# Patient Record
Sex: FEMALE | Race: OTHER | NOT HISPANIC OR LATINO | URBAN - METROPOLITAN AREA
[De-identification: names, ages, dates, MRNs, and addresses within clinical notes are randomized per-mention and may not be internally consistent; named-entity substitution may affect disease eponyms.]

---

## 2018-01-20 ENCOUNTER — EMERGENCY (EMERGENCY)
Facility: HOSPITAL | Age: 38
LOS: 1 days | Discharge: HOME | End: 2018-01-20
Attending: EMERGENCY MEDICINE | Admitting: EMERGENCY MEDICINE
Payer: COMMERCIAL

## 2018-01-20 VITALS
HEIGHT: 64 IN | HEART RATE: 106 BPM | OXYGEN SATURATION: 99 % | SYSTOLIC BLOOD PRESSURE: 117 MMHG | TEMPERATURE: 98 F | RESPIRATION RATE: 18 BRPM | DIASTOLIC BLOOD PRESSURE: 81 MMHG | WEIGHT: 169.09 LBS

## 2018-01-20 VITALS
TEMPERATURE: 98 F | RESPIRATION RATE: 18 BRPM | OXYGEN SATURATION: 97 % | HEART RATE: 88 BPM | SYSTOLIC BLOOD PRESSURE: 107 MMHG | DIASTOLIC BLOOD PRESSURE: 71 MMHG

## 2018-01-20 PROCEDURE — 93010 ELECTROCARDIOGRAM REPORT: CPT

## 2018-01-20 PROCEDURE — 99284 EMERGENCY DEPT VISIT MOD MDM: CPT | Mod: 25

## 2018-01-20 PROCEDURE — 93971 EXTREMITY STUDY: CPT

## 2018-01-20 PROCEDURE — 93005 ELECTROCARDIOGRAM TRACING: CPT

## 2018-01-20 RX ORDER — ONDANSETRON 8 MG/1
4 TABLET, FILM COATED ORAL ONCE
Qty: 0 | Refills: 0 | Status: DISCONTINUED | OUTPATIENT
Start: 2018-01-20 | End: 2018-01-20

## 2018-01-20 RX ORDER — METFORMIN HYDROCHLORIDE 850 MG/1
1 TABLET ORAL
Qty: 0 | Refills: 0 | COMMUNITY

## 2018-01-20 RX ORDER — ACETAMINOPHEN 500 MG
1000 TABLET ORAL ONCE
Qty: 0 | Refills: 0 | Status: DISCONTINUED | OUTPATIENT
Start: 2018-01-20 | End: 2018-01-20

## 2018-01-20 RX ORDER — DROSPIRENONE AND ETHINYL ESTRADIOL 0.03MG-3MG
0 KIT ORAL
Qty: 0 | Refills: 0 | COMMUNITY

## 2018-01-20 RX ORDER — IBUPROFEN 200 MG
600 TABLET ORAL ONCE
Qty: 0 | Refills: 0 | Status: COMPLETED | OUTPATIENT
Start: 2018-01-20 | End: 2018-01-20

## 2018-01-20 RX ORDER — LEVOTHYROXINE SODIUM 125 MCG
1 TABLET ORAL
Qty: 0 | Refills: 0 | COMMUNITY

## 2018-01-20 RX ADMIN — Medication 600 MILLIGRAM(S): at 22:19

## 2018-01-20 NOTE — ED ADULT NURSE NOTE - PMH
Morbid obesity    Obstructive sleep apnea  prior to    gastric bypass- never retested  Thyroid nodule

## 2018-01-20 NOTE — ED PROVIDER NOTE - PLAN OF CARE
You were seen for calf pain. Your DVT study was negative.    1) Please follow-up with your primary care doctor within the next 3 days.  Please call today or tomorrow for an appointment.  If you cannot follow-up with your doctor(s), please return to the ED for any urgent issues.  2) If you have any worsening of symptoms or any other concerns please return to the ED immediately.  3) Please continue taking your home medications as directed.  4) You may have been given a copy of your labs and/or imaging.  Please go over these with your primary care doctor.

## 2018-01-20 NOTE — ED PROVIDER NOTE - PHYSICAL EXAMINATION
AAOx3, NAD  Head: NCAT  ENT: Airway patent, MMM  Cardiac: Normal rate, normal rhythm, no murmurs/rubs/gallops appreciated  Respiratory: Lungs CTA B/L  Gastrointestinal: +BS, Abdomen soft, nontender, nondistended, no rebound, no guarding, no organomegaly   MSK: No gross abnormalities, FROM of all four extremities, no edema  HEME: Extremities warm, pulses intact and symmetrical in all four extremities  Skin: No rashes, no lesions  Neuro: No gross neurologic deficits

## 2018-01-20 NOTE — ED PROVIDER NOTE - ATTENDING CONTRIBUTION TO CARE
attending Amrit: 37yF h/o thyroid ca s/p resection PCOS on OCPs, gastric bypass 2012, recent travel to [place] for abdominoplasty and liposuction p/w L calf pain. Denies chest pain or SOB. On exam, well-appearing, no pedal edema mild L calf tenderness, negative Teresita's sign, no rashes, lungs clear, S1S2 normal. Multiple risk factors for DVT. Will obtain lower extremity duplex r/o DVT, pain control and reassess.

## 2018-01-20 NOTE — ED ADULT NURSE NOTE - OBJECTIVE STATEMENT
36 yo F pmh of gastric bypass, PCOS, and thyroid CA, currently taking synthroid, birth control AISHA, and metformin,  came to ED s/p tummy tuck and lipo surgery on 1/3/18 c/o left calf pain starting 1/15/18 and worsening today.  Pt states that she was placed on anticoagulation shots after the surgery which were d/c 1/11/18.  Pt had recent surgical procedure in [place] and had recent travel back to [place] on 1/18/18.  Pt states that she has been ambulating hourly and been compliant with directions to avoid DVT.  Pt states that the pain gradually grew worse over the past week.  Pain is worse when she walks and has relief from pain when she elevates her leg.  Denies chest pain, back pain, SOB, fevers/chills, redness or drainage from surgical site, numbness, tingling, Lightheadedness, dizziness, changes in urinary or bowel habits.  A&Ox4, lungs clear bilaterally, +peripheral pulses bilaterally, skin w/d/i, no swelling or redness noted in left calf, not tender to palpation.  Safety and comfort maintained.  Will continue to monitor.  friend present at bedside.

## 2018-01-20 NOTE — ED PROVIDER NOTE - OBJECTIVE STATEMENT
37F hx thyroid ca s/p r/s, PCOS, gastric bypass 2012, c/o of left leg pain , returned from [place] on Jan 18th, after tummy tuck and no GI/ complaints, recent f/c/ was on AC injectons last taken on the 13th. sw on OCPs synthroid, and metformed No blood thinners currently, Aunt had hx of blood clots, unknown cause. No sob/cp/ palpitations/ f/c/ vision changes/ no pain any where else 37F hx thyroid ca s/p r/s, PCOS, gastric bypass 2012, c/o of left leg pain , returned from [place] on Jan 18th, after tummy tuck/ liposuction (1/3) with subsequent 10 day course of lovenox (last taken 1/13). and no GI/ complaints, recent f/c/. Currently on OCPs, synthroid, and metformin. No blood thinners currently,. Aunt had hx of blood clots, unknown cause. No sob/cp/ palpitations/ f/c/ vision changes/ no pain any where else    PCP:

## 2018-01-20 NOTE — ED ADULT NURSE NOTE - CHPI ED SYMPTOMS NEG
no nausea/no fever/no shortness of breath/no chest pain/no back pain/no chills/no dizziness/no cough/no syncope/no vomiting/no diaphoresis

## 2018-01-20 NOTE — ED PROVIDER NOTE - CARE PLAN
Principal Discharge DX:	Pain of left calf  Assessment and plan of treatment:	You were seen for calf pain. Your DVT study was negative.    1) Please follow-up with your primary care doctor within the next 3 days.  Please call today or tomorrow for an appointment.  If you cannot follow-up with your doctor(s), please return to the ED for any urgent issues.  2) If you have any worsening of symptoms or any other concerns please return to the ED immediately.  3) Please continue taking your home medications as directed.  4) You may have been given a copy of your labs and/or imaging.  Please go over these with your primary care doctor.

## 2018-01-20 NOTE — ED ADULT TRIAGE NOTE - CHIEF COMPLAINT QUOTE
pt states left geraldine f pain onset 5 days ago worsening last night recent flight from [place] 2 nights ago pt had liposuxtion and tummy tuck in [place] January 3rd

## 2018-01-21 PROCEDURE — 93971 EXTREMITY STUDY: CPT | Mod: 26

## 2018-02-01 ENCOUNTER — TRANSCRIPTION ENCOUNTER (OUTPATIENT)
Age: 38
End: 2018-02-01

## 2018-02-01 ENCOUNTER — EMERGENCY (EMERGENCY)
Facility: HOSPITAL | Age: 38
LOS: 1 days | Discharge: HOME | End: 2018-02-01
Attending: EMERGENCY MEDICINE | Admitting: EMERGENCY MEDICINE
Payer: COMMERCIAL

## 2018-02-01 VITALS
TEMPERATURE: 98 F | OXYGEN SATURATION: 99 % | SYSTOLIC BLOOD PRESSURE: 122 MMHG | WEIGHT: 166.01 LBS | HEART RATE: 93 BPM | DIASTOLIC BLOOD PRESSURE: 88 MMHG | RESPIRATION RATE: 16 BRPM

## 2018-02-01 LAB
ANION GAP SERPL CALC-SCNC: 12 MMOL/L — SIGNIFICANT CHANGE UP (ref 5–17)
BUN SERPL-MCNC: 16 MG/DL — SIGNIFICANT CHANGE UP (ref 7–23)
CALCIUM SERPL-MCNC: 9.4 MG/DL — SIGNIFICANT CHANGE UP (ref 8.4–10.5)
CHLORIDE SERPL-SCNC: 103 MMOL/L — SIGNIFICANT CHANGE UP (ref 96–108)
CO2 SERPL-SCNC: 24 MMOL/L — SIGNIFICANT CHANGE UP (ref 22–31)
CREAT SERPL-MCNC: 0.77 MG/DL — SIGNIFICANT CHANGE UP (ref 0.5–1.3)
GLUCOSE SERPL-MCNC: 77 MG/DL — SIGNIFICANT CHANGE UP (ref 70–99)
HCT VFR BLD CALC: 35.6 % — SIGNIFICANT CHANGE UP (ref 34.5–45)
HGB BLD-MCNC: 12.5 G/DL — SIGNIFICANT CHANGE UP (ref 11.5–15.5)
MCHC RBC-ENTMCNC: 32 PG — SIGNIFICANT CHANGE UP (ref 27–34)
MCHC RBC-ENTMCNC: 35.3 GM/DL — SIGNIFICANT CHANGE UP (ref 32–36)
MCV RBC AUTO: 90.7 FL — SIGNIFICANT CHANGE UP (ref 80–100)
PLATELET # BLD AUTO: 283 K/UL — SIGNIFICANT CHANGE UP (ref 150–400)
POTASSIUM SERPL-MCNC: 4.2 MMOL/L — SIGNIFICANT CHANGE UP (ref 3.5–5.3)
POTASSIUM SERPL-SCNC: 4.2 MMOL/L — SIGNIFICANT CHANGE UP (ref 3.5–5.3)
RBC # BLD: 3.92 M/UL — SIGNIFICANT CHANGE UP (ref 3.8–5.2)
RBC # FLD: 13 % — SIGNIFICANT CHANGE UP (ref 10.3–14.5)
SODIUM SERPL-SCNC: 139 MMOL/L — SIGNIFICANT CHANGE UP (ref 135–145)
WBC # BLD: 8 K/UL — SIGNIFICANT CHANGE UP (ref 3.8–10.5)
WBC # FLD AUTO: 8 K/UL — SIGNIFICANT CHANGE UP (ref 3.8–10.5)

## 2018-02-01 PROCEDURE — 99284 EMERGENCY DEPT VISIT MOD MDM: CPT

## 2018-02-01 PROCEDURE — 93971 EXTREMITY STUDY: CPT | Mod: 26

## 2018-02-01 RX ORDER — IBUPROFEN 200 MG
600 TABLET ORAL ONCE
Qty: 0 | Refills: 0 | Status: COMPLETED | OUTPATIENT
Start: 2018-02-01 | End: 2018-02-01

## 2018-02-01 RX ADMIN — Medication 600 MILLIGRAM(S): at 21:47

## 2018-02-01 NOTE — ED ADULT NURSE NOTE - CHIEF COMPLAINT QUOTE
Left calf pain x 2 weeks, sent by urgent care for US, seen in ED 01/20 for similar symptoms- US for r/o DVT negative in ED. Pt currently on birth control, recent 9hr flight from [place] on 01/18, had abdominal surgery on 01/03. Denies recent fall/trauma/injury.

## 2018-02-01 NOTE — ED ADULT NURSE NOTE - OBJECTIVE STATEMENT
37 yr old F arrived to the ED c/o left calf pain. pt states this started two days ago and has increased in intensity. pt had abdominal plasty done in [place] on January third. since then has had another episode of calf pain that has brought her to he ED. upon assessment pt is a&ox3, neuro grossly intact, left calf appears normal in color, slightly warm to touch. pain upon palpation and flexion of foot.

## 2018-02-01 NOTE — ED ADULT NURSE NOTE - PMH
Morbid obesity    Obstructive sleep apnea  prior to    gastric bypass- never retested  PCOS (polycystic ovarian syndrome)    Thyroid nodule

## 2018-02-01 NOTE — ED PROVIDER NOTE - PROGRESS NOTE DETAILS
Attending MD Ponce: DVT+, will order labs for renal function, weight, then start Lovenox Attending MD Ponce: Labs resulted, Attending MD Ponce: Labs resulted, will give Lovenox

## 2018-02-01 NOTE — ED ADULT TRIAGE NOTE - CHIEF COMPLAINT QUOTE
Left calf pain x 2 weeks, sent by urgent care for US, seen in ED 01/20 for similar symptoms- US for r/o DVT negative in ED. Pt currently on birth control, recent 9hr flight from [place] on 01/18, had abdominal surgery on 01/03. Denies recent fall/trauma/injury.

## 2018-02-01 NOTE — ED PROVIDER NOTE - CARE PLAN
Principal Discharge DX:	DVT (deep venous thrombosis)  Assessment and plan of treatment:	Take Lovenox 80mg subcutaneous injection every 12 hours  Follow up with your Primary Care Physician within the next 2-3 days  Bring a copy of your test results with you to your appointment  Continue your current medication regimen  Return to the Emergency Room if you experience new or worsening symptoms

## 2018-02-01 NOTE — ED PROVIDER NOTE - OBJECTIVE STATEMENT
37 F with Hx of thyroid Cancer s/p resection, chemo and radiation 2 years ago. She went to [place] for cosmetic abdominoplasty and returned on a flight which took 9 hours on 1/18/18. She noticed swelling in the left calf 3 days later. She had a dvt study on 1/21/18 which was a normal study. Now presents w return of her left calf pain for the past 3 days. She went to urgent care who referred her back to the ED for repeat ultrasound. She denies fev, dyspnea.

## 2018-02-01 NOTE — ED PROVIDER NOTE - PLAN OF CARE
Take Lovenox 80mg subcutaneous injection every 12 hours  Follow up with your Primary Care Physician within the next 2-3 days  Bring a copy of your test results with you to your appointment  Continue your current medication regimen  Return to the Emergency Room if you experience new or worsening symptoms

## 2018-02-01 NOTE — ED PROVIDER NOTE - ATTENDING CONTRIBUTION TO CARE
Attending MD Ponce:   I personally have seen and examined this patient.  Physician assistant note reviewed and agree on plan of care and except where noted.  See below for details.     37F with PMH thyroid cancer s/p resection/chemo/rads 2 yrs ago presents to the ED with L calf pain for 3 days.  Reports went to [place] to have abdominoplasty, returned 1/18/18.  3 days later noted L calf swelling.  Reports had U/S on 1/21 which was normal now returns for calf pain for three days.  Reports went to urgent care and was sent in for US.  Denies chest pain, shortness of breath, palpitations. Denies fevers, chills.  Denies abdominal pain, nausea, vomiting, diarrhea, blood in stools. On exam, NAD, head NCAT, PERRL, FROM at neck, no tenderness to palpation or stepoffs along length of spine, lungs CTAB with good inspiratory effort, +S1S2, no m/r/g, abdomen soft with +BS, NT, ND, no CVAT, moving all extremities with 5/5 strength bilateral upper and lower extremities, good and equal  strength bilaterally, + L calf tenderness, ?minimal swelling, no erythema, no warmth; A/P: 37F with recent travel and surgery with L calf tenderness, will obtain U/S to r/o DVT, if positive will obtain labs for Lovenox, pain control

## 2018-02-02 VITALS
DIASTOLIC BLOOD PRESSURE: 80 MMHG | HEART RATE: 84 BPM | TEMPERATURE: 98 F | RESPIRATION RATE: 16 BRPM | OXYGEN SATURATION: 100 % | SYSTOLIC BLOOD PRESSURE: 122 MMHG

## 2018-02-02 LAB
APTT BLD: 30.6 SEC — SIGNIFICANT CHANGE UP (ref 27.5–37.4)
INR BLD: 1.01 RATIO — SIGNIFICANT CHANGE UP (ref 0.88–1.16)
PROTHROM AB SERPL-ACNC: 10.9 SEC — SIGNIFICANT CHANGE UP (ref 9.8–12.7)

## 2018-02-02 PROCEDURE — 96372 THER/PROPH/DIAG INJ SC/IM: CPT

## 2018-02-02 PROCEDURE — 99284 EMERGENCY DEPT VISIT MOD MDM: CPT | Mod: 25

## 2018-02-02 PROCEDURE — 85610 PROTHROMBIN TIME: CPT

## 2018-02-02 PROCEDURE — 80048 BASIC METABOLIC PNL TOTAL CA: CPT

## 2018-02-02 PROCEDURE — 93971 EXTREMITY STUDY: CPT

## 2018-02-02 PROCEDURE — 85027 COMPLETE CBC AUTOMATED: CPT

## 2018-02-02 PROCEDURE — 85730 THROMBOPLASTIN TIME PARTIAL: CPT

## 2018-02-02 RX ORDER — ENOXAPARIN SODIUM 100 MG/ML
80 INJECTION SUBCUTANEOUS
Qty: 6 | Refills: 0 | OUTPATIENT
Start: 2018-02-02 | End: 2018-03-03

## 2018-02-02 RX ORDER — ENOXAPARIN SODIUM 100 MG/ML
80 INJECTION SUBCUTANEOUS ONCE
Qty: 0 | Refills: 0 | Status: COMPLETED | OUTPATIENT
Start: 2018-02-02 | End: 2018-02-02

## 2018-02-02 RX ADMIN — ENOXAPARIN SODIUM 80 MILLIGRAM(S): 100 INJECTION SUBCUTANEOUS at 00:28

## 2018-02-02 RX ADMIN — Medication 600 MILLIGRAM(S): at 00:28

## 2018-08-14 PROBLEM — E28.2 POLYCYSTIC OVARIAN SYNDROME: Chronic | Status: ACTIVE | Noted: 2018-02-01

## 2018-08-15 ENCOUNTER — OUTPATIENT (OUTPATIENT)
Dept: OUTPATIENT SERVICES | Facility: HOSPITAL | Age: 38
LOS: 1 days | End: 2018-08-15
Payer: COMMERCIAL

## 2018-08-15 ENCOUNTER — APPOINTMENT (OUTPATIENT)
Dept: ULTRASOUND IMAGING | Facility: IMAGING CENTER | Age: 38
End: 2018-08-15
Payer: COMMERCIAL

## 2018-08-15 DIAGNOSIS — Z00.8 ENCOUNTER FOR OTHER GENERAL EXAMINATION: ICD-10-CM

## 2018-08-15 PROCEDURE — 93970 EXTREMITY STUDY: CPT | Mod: 26

## 2018-08-15 PROCEDURE — 93970 EXTREMITY STUDY: CPT

## 2018-08-16 ENCOUNTER — APPOINTMENT (OUTPATIENT)
Dept: ULTRASOUND IMAGING | Facility: CLINIC | Age: 38
End: 2018-08-16

## 2018-11-05 ENCOUNTER — APPOINTMENT (OUTPATIENT)
Dept: ULTRASOUND IMAGING | Facility: IMAGING CENTER | Age: 38
End: 2018-11-05
Payer: COMMERCIAL

## 2018-11-05 ENCOUNTER — OUTPATIENT (OUTPATIENT)
Dept: OUTPATIENT SERVICES | Facility: HOSPITAL | Age: 38
LOS: 1 days | End: 2018-11-05
Payer: COMMERCIAL

## 2018-11-05 DIAGNOSIS — I82.439 ACUTE EMBOLISM AND THROMBOSIS OF UNSPECIFIED POPLITEAL VEIN: ICD-10-CM

## 2018-11-05 PROCEDURE — 93970 EXTREMITY STUDY: CPT

## 2018-11-05 PROCEDURE — 93970 EXTREMITY STUDY: CPT | Mod: 26
